# Patient Record
Sex: MALE | Race: WHITE | NOT HISPANIC OR LATINO | URBAN - METROPOLITAN AREA
[De-identification: names, ages, dates, MRNs, and addresses within clinical notes are randomized per-mention and may not be internally consistent; named-entity substitution may affect disease eponyms.]

---

## 2023-10-18 ENCOUNTER — HOSPITAL ENCOUNTER (OUTPATIENT)
Facility: HOSPITAL | Age: 48
Discharge: HOME | End: 2023-10-18
Attending: EMERGENCY MEDICINE | Admitting: EMERGENCY MEDICINE
Payer: COMMERCIAL

## 2023-10-18 ENCOUNTER — APPOINTMENT (OUTPATIENT)
Dept: GENERAL RADIOLOGY | Facility: HOSPITAL | Age: 48
End: 2023-10-18
Payer: COMMERCIAL

## 2023-10-18 VITALS
HEART RATE: 100 BPM | SYSTOLIC BLOOD PRESSURE: 131 MMHG | OXYGEN SATURATION: 99 % | RESPIRATION RATE: 14 BRPM | HEIGHT: 71 IN | WEIGHT: 220 LBS | TEMPERATURE: 98 F | BODY MASS INDEX: 30.8 KG/M2 | DIASTOLIC BLOOD PRESSURE: 89 MMHG

## 2023-10-18 DIAGNOSIS — S61.412A LACERATION OF LEFT HAND, FOREIGN BODY PRESENCE UNSPECIFIED, INITIAL ENCOUNTER: Primary | ICD-10-CM

## 2023-10-18 PROCEDURE — 73130 X-RAY EXAM OF HAND: CPT

## 2023-10-18 PROCEDURE — G0463 HOSPITAL OUTPT CLINIC VISIT: HCPCS | Performed by: EMERGENCY MEDICINE

## 2023-10-18 RX ORDER — HYDROCODONE BITARTRATE AND ACETAMINOPHEN 7.5; 325 MG/1; MG/1
1 TABLET ORAL ONCE
Status: COMPLETED | OUTPATIENT
Start: 2023-10-18 | End: 2023-10-18

## 2023-10-18 RX ORDER — DOXYCYCLINE 100 MG/1
100 CAPSULE ORAL 2 TIMES DAILY
Qty: 14 CAPSULE | Refills: 0 | Status: SHIPPED | OUTPATIENT
Start: 2023-10-18 | End: 2023-10-25

## 2023-10-18 RX ORDER — OXYCODONE HYDROCHLORIDE AND ACETAMINOPHEN 5; 325 MG/1; MG/1
1 TABLET ORAL EVERY 6 HOURS PRN
Qty: 10 TABLET | Refills: 0 | Status: SHIPPED | OUTPATIENT
Start: 2023-10-18 | End: 2023-10-21

## 2023-10-18 RX ORDER — DOXYCYCLINE 100 MG/1
100 CAPSULE ORAL ONCE
Status: COMPLETED | OUTPATIENT
Start: 2023-10-18 | End: 2023-10-18

## 2023-10-18 RX ADMIN — HYDROCODONE BITARTRATE AND ACETAMINOPHEN 1 TABLET: 7.5; 325 TABLET ORAL at 13:48

## 2023-10-18 RX ADMIN — DOXYCYCLINE 100 MG: 100 CAPSULE ORAL at 13:48

## 2023-10-18 NOTE — DISCHARGE INSTRUCTIONS
On the laceration that has been glued, you do not need to apply any ointment to this area as this will actually dissolve the glue.  Please keep clean and dry.  This will start to peel off in approximately 7 days.  If it becomes reddened or swollen let us take another look at it.    I did send in an antibiotic as well as some pain control.  Take as directed.    Please read all of the instructions in this handout.  If you receive prescriptions please fill them and take them as directed.  Please call your primary care physician for follow-up appointment in the next 5 to 7 days.  If you do not have a physician you may call the [facility] referral line at [phone].    You may return to the emergency department at any time for any concerns such as worsening symptoms.  If you received a work or school note it will be printed at the back of this packet.

## 2023-10-18 NOTE — FSED PROVIDER NOTE
Subjective   History of Present Illness  The patient is a 48-year-old male.  He presents with a laceration to the dorsal aspect of his left hand as well as some superficial lacerations to the left fifth digit.  He sustained these at approximately 11:30 PM last night.  A piece of glass fell onto his hand resulting in the lacerations.  He is up-to-date on tetanus.  Full range of motion at the wrist as well as all 5 digits.  Sensation intact over the entire left hand.  No other injuries      Review of Systems  Constitutional: No fevers, chills, sweats unless otherwise documented in HPI  Eyes: No recent visual problems, eye discharge, eye pain, redness unless otherwise documented in HPI  HEENT: No ear pain, nasal congestion, sore throat, voice changes unless otherwise documented in HPI  Respiratory: No shortness of breath, cough, pain on breathing, sputum production unless otherwise documented in HPI  Cardiovascular: No chest pain, palpitations, syncope, orthopnea unless otherwise documented in HPI  Gastrointestinal: No nausea, vomiting, diarrhea, constipation unless otherwise documented in HPI  Genitourinary: No hematuria, dysuria, incontinence unless otherwise documented in HPI  Endocrine: Negative for excessive thirst, excessive hunger, excessive urination, heat or cold intolerance unless otherwise documented in HPI  Musculoskeletal: No back pain, neck pain, joint pain, muscle pain, decreased range of motion unless otherwise documented in HPI  Integumentary: No rash, pruritus, abrasion, lesions unless otherwise documented in HPI  Neurologic: No weakness, numbness, frequent headaches, tremors unless otherwise documented in HPI  Psychiatric: No anxiety, depression, mood changes, hallucinations unless otherwise documented in HPI        History reviewed. No pertinent past medical history.    No Known Allergies    History reviewed. No pertinent surgical history.    History reviewed. No pertinent family history.    Social  History     Socioeconomic History    Marital status: Single           Objective   Physical Exam  General: Awake alert no acute distress    Skin and musculoskeletal: Left hand reveals a 1.5 cm laceration on the dorsal aspect of the left proximal hand.  No active bleeding noted.  There are some very small glass foreign bodies lying on top of the wound dried and blood.  On deep exam of the laceration I do not detect any deep foreign bodies.  No tendon involvement.  Minimal bleeding noted.    The fifth digit reveals several superficial lacerations across the dorsal aspect of the proximal phalanx.  No active bleeding noted.  Very minimal erythema.  No foreign bodies.  Again full range of motion at the MCP PIP and DIP joint of the left fifth digit.  Sensation intact over the entire digit    Procedures           ED Course      [facility] from [facility] has been reviewed.  No narcotics noted in the last 6 months.  Patient is maintained on Adderall XR for ADHD.     Medications   HYDROcodone-acetaminophen (NORCO) 7.5-325 MG per tablet 1 tablet (has no administration in time range)   doxycycline (MONODOX) capsule 100 mg (has no administration in time range)                          XR Hand 3+ View Left    Result Date: 10/18/2023  LEFT HAND: PA, LATERAL, OBLIQUE  HISTORY: Cut hand with glass last night. Laceration to the posterior hand.  COMPARISON: None  FINDINGS: There is cutaneous irregularity and defect at the dorsum of the wrist where an arrow indicates the site of soft tissue injury. No fracture or acute osseous abnormality is evident. There small foci of increased density within the soft tissues dorsal/ulnar to the bases of the fourth and fifth fingers suspicious for small glass fragments though these may be on the skin surface.      Soft tissue injury along the dorsum of the wrist without evidence for underlying fracture or acute osseous normality. There are small dense fragments, potential glass fragments dorsal/medial to the  4th and 5th fingers that could be on the skin surface.         Laceration Repair: The 1.5 cm laceration on the dorsal aspect of the hand was cleansed with a Hibiclens scrub.  I did remove all of the dried blood and tiny particles of glass.  I irrigated the wound copiously with normal saline.  Repeat examination reveals no evidence of foreign bodies within the wound.  The 1.5 cm laceration was closed utilizing tissue adhesive.  Very good skin approximation and complete hemostasis achieved.  A dry sterile dressing was placed      Wound care: The superficial lacerations on the fifth proximal phalanx were cleansed with Hibiclens and sterile saline.  No foreign bodies noted.  A trip on buttock ointment and Band-Aid was applied to the superficial lacerations.          Medical Decision Making  Amount and/or Complexity of Data Reviewed  Radiology: ordered.    Risk  Prescription drug management.    The x-rays were independently reviewed as well as review of the radiologist interpretation    Final diagnoses:   Laceration of left hand, foreign body presence unspecified, initial encounter       ED Disposition  ED Disposition       ED Disposition   Discharge    Condition   Stable    Comment   --               [facility] - [place] [id number]  [phone]  In 1 week           Medication List        New Prescriptions      doxycycline 100 MG capsule  Commonly known as: MONODOX  Take 1 capsule by mouth 2 (Two) Times a Day for 7 days.     oxyCODONE-acetaminophen 5-325 MG per tablet  Commonly known as: PERCOCET  Take 1 tablet by mouth Every 6 (Six) Hours As Needed for Moderate Pain for up to 3 days.               Where to Get Your Medications        These medications were sent to [facility] - [place], [place] - [facility] [address] - [phone]  - [phone] [facility] El[address], [facility] [address]      Phone: [phone]   doxycycline 100 MG capsule  oxyCODONE-acetaminophen 5-325  MG per tablet